# Patient Record
Sex: FEMALE | Race: WHITE | NOT HISPANIC OR LATINO | Employment: FULL TIME | URBAN - METROPOLITAN AREA
[De-identification: names, ages, dates, MRNs, and addresses within clinical notes are randomized per-mention and may not be internally consistent; named-entity substitution may affect disease eponyms.]

---

## 2019-03-19 NOTE — PROGRESS NOTES
Subjective:       Patient ID: Leidy Umanzor is a 48 y.o. female.    Chief Complaint:  Urinary Incontinence and incomplete bladder emptying      History of Present Illness  HPI 48 Y O F P  has no past medical history on file. referred by her friends who are patients of this practice for evaluation. She has bothersome urinary urgency and small urge incontinence for the past 6 months which seems to be worsening.     [facility]     Question 3/20/2019 10:06 AM CDT - Filed by Patient on 3/20/2019   General Urogynecology: Are you experiencing the following?    Dysuria (painful urination) No   Nocturia:  waking up at night to empty your bladder  Yes   If you answered yes to the previous question, how many times does this happen per night? 3-4, some nights    Enuresis (urine loss during sleep) Yes?  Small drop    Dribbling urine after you urinate Yes   Hematuria (urine appears red) No   Type of stream Strong   Urinary frequency: How often a day are you going to the bathroom per day?  Less than 10   Urinary Tract Infections: How many Urinary Tract Infections have you had in the past year? I have not had a UTI in the past year   If you have had a UTI in the past year, what treatments have you had so far?  I have not had a UTI in the past year   Urinary Incontinence (General): Are you experiencing the following?    Past consultation for incontinence: Have you ever seen someone for the evaluation of incontinence? No   If you answered yes to the previous question, please select all the therapies you have tried.  N/a- I answered no to the previous question   Please note the effectiveness of the therapies.    Need to wear protection to keep clothes dry  Yes   If you answered yes to the previous question, please flaco the protection you use.  Pads, just when she is going out or may have difficulty getting to the rest room    If you wear protection, how much wetness is typically on each pad? Light   If you wear protection, how  "often do you have to change per day, if applicable?  3-4   Stress Symptoms: Are you experiencing the following?    Leakage of urine with cough, laugh and/or sneeze Yes   If you answered yes to the previous question, what is the frequency in days, weeks and/or months? Daily   Leakage of urine with sex No   Leakage of urine with bending/ lifting Yes   Leakage of urine with briskly walking or jogging No   If you lose urine for any other reason not previously mentioned, please note it below, if applicable.     Urge Symptoms: Are you experiencing the following?    Urgency ("got to go" feeling) Yes    Urge: How frequently do you feel an urge to urinate (feeling like you "gotta go" to the bathroom and can't wait) Daily   Do you experience a leakage of urine when you have a feeling of urgency?  Yes   Leakage of urine when unaware Yes, but only happens at night    Past use of anticholinergics (medications used to treat overactive bladder) No   If you answered yes to the previous question, please flaco the anticholinergics you have used:     Have you ever used Mirbetriq (aka Mirabegron)?  No   Prolapse Symptoms: Are you experiencing any of the following?     Falling out/ Bulging/ Heaviness in the vagina No   Vaginal/ Abdominal Pain/ Pressure Yes   Need to strain/ Push to void No   Need to wait on the toilet before you void No   Unusual position to urinate (using your hands to push back the vaginal bulge) No   Sensation of incomplete emptying No   Past use of pessary device No   If you answered yes to the previous question, please list the devices you have used below.     Bowel Symptoms: Are you experiencing any of the following?    Constipation No   Diarrhea  No   Hematochezia (bloody stool) No   Incomplete evacuation of stool No   Involuntary loss of formed stool No   Fecal smearing/urgency No   Involuntary loss of gas No   Vaginal Symptoms: Are you experiencing any of the following?     Abnormal vaginal bleeding  No, regular " now with Leonie    Vaginal dryness No   Sexually active  Yes   Dyspareunia (painful intercourse) No   Estrogen use  Yes, OCP        GYN & OB History  No LMP recorded.   Date of Last Pap: No result found    OB History    Para Term  AB Living   2 2 2         SAB TAB Ectopic Multiple Live Births                  # Outcome Date GA Lbr Prudencio/2nd Weight Sex Delivery Anes PTL Lv   2 Term            1 Term               OB  Largest: 7#  Forceps: no  Episiotomy: yes     GYN  Menarche: 15  Menstrual cycle: 28-30/4-5/moderate flow; PMS  Menopause: no   Hysterectomy: no  Ovaries: present       History reviewed. No pertinent past medical history.  History reviewed. No pertinent surgical history.    Current Outpatient Medications:     citalopram (CELEXA) 20 MG tablet, TAKE 1/2 TABLET BY MOUTH EACH DAY FOR 7 DAYS THEN INCREASE TO 1 TABLET ONCE DAILY, Disp: , Rfl: 3    LEONIE, 28, 3-0.02 mg per tablet, Take 1 tablet by mouth once daily., Disp: , Rfl: 3    spironolactone (ALDACTONE) 50 MG tablet, , Disp: , Rfl: 12  Review of patient's allergies indicates:  No Known Allergies    Review of Systems  Review of Systems   Constitutional: Negative.  Negative for activity change, appetite change, chills, diaphoresis, fatigue, fever and unexpected weight change.   HENT: Negative.    Eyes: Negative.    Respiratory: Negative.  Negative for apnea, cough and wheezing.    Cardiovascular: Negative.  Negative for chest pain and palpitations.   Gastrointestinal: Negative for abdominal distention, abdominal pain, anal bleeding, blood in stool, diarrhea, nausea, rectal pain and vomiting.   Endocrine: Negative.    Genitourinary: Positive for frequency and urgency. Negative for decreased urine volume, difficulty urinating, dyspareunia, dysuria, enuresis, flank pain, genital sores, hematuria, menstrual problem, pelvic pain, vaginal bleeding, vaginal discharge and vaginal pain.   Musculoskeletal: Negative for back pain and gait problem.    Skin: Negative for color change, pallor, rash and wound.   Allergic/Immunologic: Negative for immunocompromised state.   Neurological: Negative.  Negative for dizziness and speech difficulty.   Hematological: Negative for adenopathy.   Psychiatric/Behavioral: Negative for agitation, behavioral problems, confusion and sleep disturbance.           Objective:     Physical Exam   Constitutional: She is oriented to person, place, and time. She appears well-developed.   HENT:   Head: Normocephalic and atraumatic.   Eyes: Conjunctivae and EOM are normal.   Neck: Normal range of motion. Neck supple.   Cardiovascular: Normal rate, regular rhythm, S1 normal, S2 normal, normal heart sounds and intact distal pulses.   Pulmonary/Chest: Effort normal and breath sounds normal. She exhibits no tenderness.   Abdominal: Soft. Bowel sounds are normal. She exhibits no distension and no mass. There is no hepatosplenomegaly, splenomegaly or hepatomegaly. There is no tenderness. There is no rigidity, no rebound, no guarding and no CVA tenderness. No hernia.   Genitourinary: Pelvic exam was performed with patient supine. Rectum normal, vagina normal, uterus normal, cervix normal, skenes normal and bartholins normal. Right labia normal and left labia normal. Urethra exhibits hypermobility. Urethra exhibits no urethral caruncle, no urethral diverticulum and no urethral mass. Right bartholin is not enlarged and not tender. Left bartholin is not enlarged and not tender. Rectal exam shows resting tone normal and active tone normal. Rectal exam shows no external hemorrhoid, no fissure, no tenderness, anal tone normal and no dovetailing. Guaiac negative stool. There is atrophy in the vagina. No foreign body, tenderness, bleeding, unspecified prolapse of vaginal walls, fistula, mesh exposure or lavator tenderness in the vagina. No vaginal discharge found. Right adnexum displays no mass and no tenderness. Left adnexum displays no mass and no  tenderness. Cervix exhibits no motion tenderness and no discharge. Uterus is not tender and not experiencing uterine prolapse.   PVR: 225 ML  Empty cough stress test: Negative.  Kegel: 1/5    POP-Q  Aa: -2 Ba: -2 C: -5   GH: 3 PB: 2 TVL: 9   Ap: -1 Bp: -1 D: -6                     Musculoskeletal: Normal range of motion.   Lymphadenopathy:     She has no axillary adenopathy.        Right: No inguinal adenopathy present.        Left: No inguinal adenopathy present.   Neurological: She is alert and oriented to person, place, and time. She has normal strength and normal reflexes. Cranial nerves II through XII intact. No cranial nerve deficit.   Skin: Skin is warm, dry and intact.   Psychiatric: She has a normal mood and affect. Her speech is normal and behavior is normal. Judgment normal. Cognition and memory are normal.           HCM  Pap's: normal, 2018- no history of abnormal    Mammo: normal 2018  Colonoscopy: No   Dexa: No        Assessment:        1. Mixed incontinence    2. Incomplete bladder emptying    3. Nocturia             Plan:        1.  Mixed urinary incontinence, urge > > stress:   --Bladder diary for 3-7 days, write the number of times you go to the rest room and associated symptoms of urgency or leakage.   --Empty bladder every 3 hours.  Empty well: wait a minute, lean forward on toilet.    --Avoid dietary irritants (see sheet).  Keep diary x 3-5 days to determine your irritants.  --KEGELS: do 10 in AM and 10 in PM, holding each x 10 seconds.  When you feel urge to go, STOP, KEGEL, and when urge has passed, then go to bathroom.  Start PT   --URGE: Consider Ditropan 10 mg daily or Myrbetriq 50 mg daily.  SE profile reviewed.   Takes 2-4 weeks to see if will have effect.  For dry mouth: get sour, sugar free lozenge or gum.  Will hold off for now due to incomplete bladder emptying   --STRESS:  Pessary vs. Sling.     2. --Nocturia (nighttime urination): stop fluids 2 hours before bed.  If have leg  swelling:  Elevate feet above chest x 1 hour before bed to get excess fluid off.  Can also use support hose (knee highs).    3.   Incomplete bladder emptying:  --Double void and Crede maneuvers discussed  --Repeat PVR at the next visit    --renal sonogram     Approximately 50 min were spent in consult, 90 % in discussion.     Thank you for requesting consultation of your patient.  I look forward to participating in her care.    Horace Lynn DO  Female Pelvic Medicine and Reconstructive Surgery  [facility] [place], [place]

## 2019-03-20 ENCOUNTER — OFFICE VISIT (OUTPATIENT)
Dept: UROGYNECOLOGY | Facility: CLINIC | Age: 49
End: 2019-03-20
Payer: COMMERCIAL

## 2019-03-20 VITALS
BODY MASS INDEX: 35.67 KG/M2 | SYSTOLIC BLOOD PRESSURE: 110 MMHG | WEIGHT: 214.06 LBS | HEIGHT: 65 IN | DIASTOLIC BLOOD PRESSURE: 78 MMHG

## 2019-03-20 DIAGNOSIS — N39.46 MIXED INCONTINENCE: Primary | ICD-10-CM

## 2019-03-20 DIAGNOSIS — R33.9 INCOMPLETE BLADDER EMPTYING: ICD-10-CM

## 2019-03-20 DIAGNOSIS — R35.1 NOCTURIA: ICD-10-CM

## 2019-03-20 PROCEDURE — 87086 URINE CULTURE/COLONY COUNT: CPT

## 2019-03-20 PROCEDURE — 99205 PR OFFICE/OUTPT VISIT, NEW, LEVL V, 60-74 MIN: ICD-10-PCS | Mod: S$GLB,,, | Performed by: OBSTETRICS & GYNECOLOGY

## 2019-03-20 PROCEDURE — 99205 OFFICE O/P NEW HI 60 MIN: CPT | Mod: S$GLB,,, | Performed by: OBSTETRICS & GYNECOLOGY

## 2019-03-20 PROCEDURE — 3008F PR BODY MASS INDEX (BMI) DOCUMENTED: ICD-10-PCS | Mod: S$GLB,,, | Performed by: OBSTETRICS & GYNECOLOGY

## 2019-03-20 PROCEDURE — 99999 PR PBB SHADOW E&M-NEW PATIENT-LVL IV: ICD-10-PCS | Mod: PBBFAC,,, | Performed by: OBSTETRICS & GYNECOLOGY

## 2019-03-20 PROCEDURE — 99999 PR PBB SHADOW E&M-NEW PATIENT-LVL IV: CPT | Mod: PBBFAC,,, | Performed by: OBSTETRICS & GYNECOLOGY

## 2019-03-20 PROCEDURE — 3008F BODY MASS INDEX DOCD: CPT | Mod: S$GLB,,, | Performed by: OBSTETRICS & GYNECOLOGY

## 2019-03-20 RX ORDER — DROSPIRENONE AND ETHINYL ESTRADIOL 0.02-3(28)
1 KIT ORAL DAILY
Refills: 3 | COMMUNITY
Start: 2019-01-15

## 2019-03-20 RX ORDER — CITALOPRAM 20 MG/1
TABLET, FILM COATED ORAL
Refills: 3 | COMMUNITY
Start: 2019-01-01

## 2019-03-20 RX ORDER — SPIRONOLACTONE 50 MG/1
TABLET, FILM COATED ORAL
Refills: 12 | COMMUNITY
Start: 2019-03-19

## 2019-03-20 NOTE — PATIENT INSTRUCTIONS
1.  Mixed urinary incontinence, urge > > stress:   --Bladder diary for 3-7 days, write the number of times you go to the rest room and associated symptoms of urgency or leakage.   --Empty bladder every 3 hours.  Empty well: wait a minute, lean forward on toilet.    --Avoid dietary irritants (see sheet).  Keep diary x 3-5 days to determine your irritants.  --KEGELS: do 10 in AM and 10 in PM, holding each x 10 seconds.  When you feel urge to go, STOP, KEGEL, and when urge has passed, then go to bathroom.  Start PT   --URGE: Consider Ditropan 10 mg daily or Myrbetriq 50 mg daily.  SE profile reviewed.   Takes 2-4 weeks to see if will have effect.  For dry mouth: get sour, sugar free lozenge or gum.    --STRESS:  Pessary vs. Sling.     2. --Nocturia (nighttime urination): stop fluids 2 hours before bed.  If have leg swelling:  Elevate feet above chest x 1 hour before bed to get excess fluid off.  Can also use support hose (knee highs).      3.   Incomplete bladder emptying:  --Double void and Crede maneuvers discussed  --Repeat PVR at the next visit    --renal sonogram

## 2019-03-21 LAB — BACTERIA UR CULT: NO GROWTH

## 2019-03-25 ENCOUNTER — TELEPHONE (OUTPATIENT)
Dept: UROGYNECOLOGY | Facility: CLINIC | Age: 49
End: 2019-03-25

## 2019-03-25 NOTE — TELEPHONE ENCOUNTER
----- Message from Horace Lynn DO sent at 3/23/2019  9:59 AM CDT -----  Please call the patient to let her know that the urine results are normal. Thank you

## 2019-04-17 ENCOUNTER — TELEPHONE (OUTPATIENT)
Dept: UROGYNECOLOGY | Facility: CLINIC | Age: 49
End: 2019-04-17

## 2019-04-17 DIAGNOSIS — K42.9 UMBILICAL HERNIA WITHOUT OBSTRUCTION AND WITHOUT GANGRENE: Primary | ICD-10-CM

## 2019-04-17 NOTE — TELEPHONE ENCOUNTER
----- Message from Maged Roberto sent at 4/17/2019 10:57 AM CDT -----  Please call pt she needs you to fax a referral to a gen surgery [phone]

## 2019-04-17 NOTE — TELEPHONE ENCOUNTER
Patient called requesting a general surgery rx no reason given. Tried to call both her number and her  number. unable to leave a message.

## 2019-04-17 NOTE — TELEPHONE ENCOUNTER
----- Message from Celso Salas sent at 4/17/2019  9:51 AM CDT -----  Contact: LILIA PINZON [[id number]]  Name of Who is Calling:LILIA PINZON [[id number]]        What is the request in detail: Pt requesting a referral to a General Surgeon based on her 3.20.19 visit. Contact at your earliest convenience.  Thanks-          Can the clinic reply by [facility]:N     What [facility]: [phone]

## 2019-04-22 ENCOUNTER — PATIENT MESSAGE (OUTPATIENT)
Dept: UROGYNECOLOGY | Facility: CLINIC | Age: 49
End: 2019-04-22

## 2019-12-02 DIAGNOSIS — Z12.31 ENCOUNTER FOR SCREENING MAMMOGRAM FOR MALIGNANT NEOPLASM OF BREAST: Primary | ICD-10-CM

## 2019-12-27 ENCOUNTER — HOSPITAL ENCOUNTER (OUTPATIENT)
Dept: RADIOLOGY | Facility: HOSPITAL | Age: 49
Discharge: HOME | End: 2019-12-27
Attending: SPECIALIST
Payer: COMMERCIAL

## 2019-12-27 VITALS — HEIGHT: 65 IN | WEIGHT: 214.06 LBS | BODY MASS INDEX: 35.67 KG/M2

## 2019-12-27 DIAGNOSIS — Z12.31 ENCOUNTER FOR SCREENING MAMMOGRAM FOR MALIGNANT NEOPLASM OF BREAST: ICD-10-CM
